# Patient Record
Sex: MALE | Race: WHITE
[De-identification: names, ages, dates, MRNs, and addresses within clinical notes are randomized per-mention and may not be internally consistent; named-entity substitution may affect disease eponyms.]

---

## 2022-06-27 ENCOUNTER — HOSPITAL ENCOUNTER (EMERGENCY)
Dept: HOSPITAL 75 - ER | Age: 42
Discharge: HOME | End: 2022-06-27
Payer: COMMERCIAL

## 2022-06-27 VITALS — SYSTOLIC BLOOD PRESSURE: 151 MMHG | DIASTOLIC BLOOD PRESSURE: 77 MMHG

## 2022-06-27 VITALS — WEIGHT: 239.86 LBS | BODY MASS INDEX: 33.58 KG/M2 | HEIGHT: 70.98 IN
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
## 2022-06-27 DIAGNOSIS — S81.812A: Primary | ICD-10-CM

## 2022-06-27 PROCEDURE — 12002 RPR S/N/AX/GEN/TRNK2.6-7.5CM: CPT

## 2022-06-27 NOTE — ED LOWER EXTREMITY
General


Chief Complaint:  Laceration


Stated Complaint:  L LEG LAC


Nursing Triage Note:  


PT TO RM [facility] BY WC WITH COMPLAINT OF LACERATION TO LEFT CALF. STATES WAS CHOPPING 


DOWN TREE AND CUT LEG WITH AX. UP TO DATE TETANUS.





History of Present Illness


Date Seen by Provider:  Jun 27, 2022


Time Seen by Provider:  16:40


Initial Comments


41-year-old  male presents for a laceration to his left calf which 

incurred while he was chopping down a tree with an ax.  He is current on tetanus

vaccine.  He denies any other injuries.


Onset:  just prior to arrival


Pain/Injury Location:  left leg


Method of Injury:  incised





Allergies and Home Medications


Allergies


Coded Allergies:  


     No Known Drug Allergies (Unverified , 6/27/22)





Patient Home Medication List


Home Medication List Reviewed:  Yes





Review of Systems


Constitutional:  no symptoms reported, see HPI


Skin:  see HPI, other (Laceration left leg)





All Other Systems Reviewed


Negative Unless Noted:  Yes





Past Medical-Social-Family Hx


Patient Social History


Tobacco Use?:  No


Use of E-Cig and/or Vaping dev:  No


Substance use?:  No


Alcohol Use?:  No





Family Medical History


Reviewed Nursing Family Hx





Physical Exam


Vital Signs





Vital Signs - First Documented








 6/27/22





 16:33


 


Temp 36.6


 


Pulse 75


 


Resp 16


 


B/P (MAP) 151/77 (101)


 


Pulse Ox 98


 


O2 Delivery Room Air





Capillary Refill : Less Than 3 Seconds


Height, Weight, BMI


Height: '"


Weight: lbs. oz. kg; 33.00 BMI


Method:


General Appearance:  WD/WN, no apparent distress


Cardiovascular:  normal peripheral pulses, regular rate, rhythm


Respiratory:  chest non-tender, lungs clear


Legs:  left leg normal range of motion, left leg abrasions (4.5 cm lac), left 

leg soft tissue tenderness


Neurologic/Psychiatric:  no motor/sensory deficits, alert, normal mood/affect, 

oriented x 3


Skin:  normal color, warm/dry





Procedures/Interventions





   Wound Location:  Lower Extremities (left leg, calf)


   Wound Length (cm):  4.5


   Wound's Depth, Shape:  superficial


   Wound Explored:  clean


   Irrigated w/ Saline (ccs):  500


   Betadine Prep?:  Yes


   Anesthesia:  1% Lidocaine


   Volume Anesthetic (ccs):  8


   Suture:  Ethlion


   Suture Size:  4-0


   Number of Sutures:  5


   Sterile Dressing Applied?:  Yes


Progress


Patient tolerated procedure well, wound approximated.  Sterile bulky dressing 

applied.





Progress/Results/Core Measures


Results/Orders


Vital Signs/I&O











 6/27/22 6/27/22





 16:33 17:16


 


Temp 36.6 36.6


 


Pulse 75 75


 


Resp 16 16


 


B/P (MAP) 151/77 (101) 151/77


 


Pulse Ox 98 98


 


O2 Delivery Room Air Room Air














Blood Pressure Mean:                    101











Departure


Impression





   Primary Impression:  


   Laceration of left leg


   Qualified Codes:  S81.812A - Laceration without foreign body, left lower leg,

   initial encounter


Disposition:  01 HOME, SELF-CARE


Condition:  Improved





Departure-Patient Inst.


Decision time for Depature:  17:05


Referrals:  


NO,LOCAL PHYSICIAN (PCP/Family)


Primary Care Physician


Patient Instructions:  Laceration Repair With Stitches (DC)





Add. Discharge Instructions:  


Keep wound clean and dry, do not remove the current dressing for 24 hours.


You may shower daily starting tomorrow.  After showering clean wound with 

alcohol.


Keep wound covered when you are away from home, you may leave it open to air 

while you are at home.


Watch for signs of infection: Redness, swelling, fever, discolored drainage.


Return to the emergency department or your primary care provider in 7 to 10 days

to have your sutures removed.


You may apply an ice pack and elevate your leg if there is swelling.


Return to the emergency department for new, urgent healthcare needs.





All discharge instructions reviewed with patient and/or family. Voiced 

understanding.











DIXIE ESTEVES                  Jun 27, 2022 17:13

## 2022-07-09 ENCOUNTER — HOSPITAL ENCOUNTER (EMERGENCY)
Dept: HOSPITAL 75 - ER | Age: 42
Discharge: HOME | End: 2022-07-09
Payer: COMMERCIAL

## 2022-07-09 VITALS — BODY MASS INDEX: 34.57 KG/M2 | HEIGHT: 70.87 IN | WEIGHT: 246.92 LBS

## 2022-07-09 VITALS — SYSTOLIC BLOOD PRESSURE: 125 MMHG | DIASTOLIC BLOOD PRESSURE: 82 MMHG

## 2022-07-09 DIAGNOSIS — Z48.02: Primary | ICD-10-CM
